# Patient Record
Sex: MALE | Race: WHITE | URBAN - METROPOLITAN AREA
[De-identification: names, ages, dates, MRNs, and addresses within clinical notes are randomized per-mention and may not be internally consistent; named-entity substitution may affect disease eponyms.]

---

## 2023-01-13 ENCOUNTER — OFFICE VISIT (OUTPATIENT)
Dept: URBAN - METROPOLITAN AREA CLINIC 98 | Facility: CLINIC | Age: 48
End: 2023-01-13
Payer: COMMERCIAL

## 2023-01-13 ENCOUNTER — LAB OUTSIDE AN ENCOUNTER (OUTPATIENT)
Dept: URBAN - METROPOLITAN AREA CLINIC 98 | Facility: CLINIC | Age: 48
End: 2023-01-13

## 2023-01-13 ENCOUNTER — WEB ENCOUNTER (OUTPATIENT)
Dept: URBAN - METROPOLITAN AREA CLINIC 98 | Facility: CLINIC | Age: 48
End: 2023-01-13

## 2023-01-13 VITALS
WEIGHT: 250.2 LBS | DIASTOLIC BLOOD PRESSURE: 86 MMHG | SYSTOLIC BLOOD PRESSURE: 137 MMHG | HEIGHT: 72 IN | TEMPERATURE: 97.1 F | BODY MASS INDEX: 33.89 KG/M2 | HEART RATE: 79 BPM

## 2023-01-13 DIAGNOSIS — R74.8 ELEVATED LIVER ENZYMES: ICD-10-CM

## 2023-01-13 PROCEDURE — 99244 OFF/OP CNSLTJ NEW/EST MOD 40: CPT | Performed by: INTERNAL MEDICINE

## 2023-01-13 PROCEDURE — 99204 OFFICE O/P NEW MOD 45 MIN: CPT | Performed by: INTERNAL MEDICINE
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
## 2023-02-22 ENCOUNTER — OFFICE VISIT (OUTPATIENT)
Dept: URBAN - METROPOLITAN AREA SURGERY CENTER 18 | Facility: SURGERY CENTER | Age: 48
End: 2023-02-22
Payer: COMMERCIAL

## 2023-02-22 DIAGNOSIS — Z12.11 COLON CANCER SCREENING: ICD-10-CM

## 2023-02-22 DIAGNOSIS — D12.4 ADENOMA OF DESCENDING COLON: ICD-10-CM

## 2023-02-22 PROCEDURE — G8907 PT DOC NO EVENTS ON DISCHARG: HCPCS | Performed by: INTERNAL MEDICINE

## 2023-02-22 PROCEDURE — 45385 COLONOSCOPY W/LESION REMOVAL: CPT | Performed by: INTERNAL MEDICINE

## 2023-07-13 ENCOUNTER — LAB OUTSIDE AN ENCOUNTER (OUTPATIENT)
Dept: URBAN - METROPOLITAN AREA CLINIC 98 | Facility: CLINIC | Age: 48
End: 2023-07-13

## 2023-07-20 PROBLEM — 197321007: Status: ACTIVE | Noted: 2023-07-20

## 2023-07-21 ENCOUNTER — OFFICE VISIT (OUTPATIENT)
Dept: URBAN - METROPOLITAN AREA CLINIC 98 | Facility: CLINIC | Age: 48
End: 2023-07-21
Payer: COMMERCIAL

## 2023-07-21 VITALS
TEMPERATURE: 97.4 F | HEIGHT: 72 IN | WEIGHT: 251.6 LBS | HEART RATE: 78 BPM | BODY MASS INDEX: 34.08 KG/M2 | DIASTOLIC BLOOD PRESSURE: 91 MMHG | SYSTOLIC BLOOD PRESSURE: 143 MMHG

## 2023-07-21 DIAGNOSIS — E78.2 ELEVATED CHOLESTEROL WITH HIGH TRIGLYCERIDES: ICD-10-CM

## 2023-07-21 DIAGNOSIS — K76.0 FATTY (CHANGE OF) LIVER, NOT ELSEWHERE CLASSIFIED: ICD-10-CM

## 2023-07-21 DIAGNOSIS — I25.10 ATHEROSCLEROTIC HEART DISEASE OF NATIVE CORONARY ARTERY WITHOUT ANGINA PECTORIS: ICD-10-CM

## 2023-07-21 DIAGNOSIS — R74.8 ELEVATED LIVER ENZYMES: ICD-10-CM

## 2023-07-21 DIAGNOSIS — I25.84 CORONARY ATHEROSCLEROSIS DUE TO CALCIFIED CORONARY LESION: ICD-10-CM

## 2023-07-21 PROBLEM — 92517006: Status: ACTIVE | Noted: 2023-07-21

## 2023-07-21 PROBLEM — 129591001: Status: ACTIVE | Noted: 2023-07-21

## 2023-07-21 PROBLEM — 445512009: Status: ACTIVE | Noted: 2023-07-21

## 2023-07-21 PROCEDURE — 99214 OFFICE O/P EST MOD 30 MIN: CPT | Performed by: INTERNAL MEDICINE

## 2023-07-21 NOTE — HPI-TODAY'S VISIT:
HEre to follow up Found M spike on labs by Dr Gresham  Sent to [facility] for monoclonal gammopathy  BM bx and PET done : nl per reports

## 2023-07-21 NOTE — HPI-OTHER HISTORIES
7/2023 BONE MARROW (ASPIRATE SMEAR, CLOT SECTION, TOUCH PREPARATION AND CORE BIOPSY):   - NORMOCELLULAR MARROW (60% CELLULAR) WITH TRILINEAGE HEMATOPOIESIS AND       INVOLVED BY A PLASMA CELL NEOPLASM.     - PLASMA CELLS COMPRISE 25-30% OF MARROW CELLULARITY AND ARE KAPPA       RESTRICTED.   - SEE MICROSCOPIC DESCRIPTION AND COMMENT.                                                                          Alida Nj MD                                                                      ***Electronically Signed Out***  Comment In summary, the findings are those of a normocellular marrow with maturing trilineage hematopoiesis.  Plasma cells comprise 25-30% of the marrow cellularity by immunohistochemistry and are kappa restricted by both immunohistochemistry and flow cytometry.  The patient's history of a monoclonal gammopathy is noted.  Correlation with pending genetic studies is recommended.  A final summary report will be issued after pending ancillary studies are performed by [employer].   REPORT [facility] 7/2023 F-18 FDG PET/CT WHOLE BODY CT PERFORMED FOR ATTENUATION CORRECTION AND ANATOMIC LOCALIZATION  CLINICAL HISTORY: 47-year-old male with monoclonal gammopathy. Patient presents for whole-body F-18 FDG PET/CT scan for staging/evaluation for multiple myeloma.  TECHNIQUE:  Multiple helical CT images were taken from the skull vertex to the feet for attenuation correction and anatomic localization cysts. Oral contrast was administered 1 hour prior to the CT examination. No intravenous contrast was administered. 57 minutes after the intravenous administration of a net dose of 14.30 mCi F-18 FDG, whole body PET imaging was performed. The images obtained were evaluated following reformation and reconstruction in the axial, sagittal and coronal planes. The patient's blood glucose level at the time of the examination was 186 mg/dL.  COMPARISON: CT guided bone marrow biopsy from July 14, 2023  INITIAL PET/CT SCAN  This exam was performed using automated exposure control. Up-to-date CT equipment and radiation dose reduction techniques were employed.   FINDINGS: HEAD AND NECK: There is no significant FDG avid lymphadenopathy in the neck. There are subcentimeter cervical lymph nodes with FDG uptake below blood pool level, likely nonspecific/reactive. The paranasal sinuses and mastoid air cells are clear.  The airway is widely patent.  There is no FDG avid nodules within the thyroid gland.  THORAX: There is no FDG avid lymphadenopathy in the chest. There is no FDG avid pulmonary foci. No pleural or pericardial effusion. Hiatal hernia noted. Coronary calcifications noted.  ABDOMEN AND PELVIS: The spleen is normal in size and FDG uptake. There is physiologic distribution of radiotracer within the liver, spleen, kidneys, adrenal glands, and pancreas. There is no FDG avid lymphadenopathy in the abdomen and pelvis. There is variable nonspecific FDG uptake within the bowel. Fatty infiltration of the liver noted. Fat-containing umbilical hernia noted.  SKELETAL SYSTEM: There is no FDG avid osseous lesions.  IMPRESSION:  1. No metabolically active lytic osseous lesions to suggest metabolically active multiple myeloma. 2. No significant FDG avid abnormalities in the neck, chest, abdomen or pelvis. .  5/2023 IGG 2326  tg 174 ldl 80 alt 76 ast 36 alp 64 tbili 0.6 cr 1.03 a1c 8.2  hb 14.6 plt 226

## 2023-11-01 ENCOUNTER — LAB OUTSIDE AN ENCOUNTER (OUTPATIENT)
Dept: URBAN - METROPOLITAN AREA CLINIC 98 | Facility: CLINIC | Age: 48
End: 2023-11-01

## 2024-01-12 ENCOUNTER — OFFICE VISIT (OUTPATIENT)
Dept: URBAN - METROPOLITAN AREA CLINIC 98 | Facility: CLINIC | Age: 49
End: 2024-01-12

## 2024-02-20 ENCOUNTER — LAB (OUTPATIENT)
Dept: URBAN - METROPOLITAN AREA CLINIC 98 | Facility: CLINIC | Age: 49
End: 2024-02-20

## 2024-02-20 ENCOUNTER — OV EP (OUTPATIENT)
Dept: URBAN - METROPOLITAN AREA CLINIC 98 | Facility: CLINIC | Age: 49
End: 2024-02-20
Payer: COMMERCIAL

## 2024-02-20 VITALS
HEIGHT: 72 IN | WEIGHT: 251 LBS | DIASTOLIC BLOOD PRESSURE: 90 MMHG | TEMPERATURE: 97.9 F | SYSTOLIC BLOOD PRESSURE: 143 MMHG | BODY MASS INDEX: 34 KG/M2 | HEART RATE: 74 BPM

## 2024-02-20 DIAGNOSIS — R74.8 ELEVATED LIVER ENZYMES: ICD-10-CM

## 2024-02-20 DIAGNOSIS — I25.84 CORONARY ATHEROSCLEROSIS DUE TO CALCIFIED CORONARY LESION: ICD-10-CM

## 2024-02-20 DIAGNOSIS — E78.2 ELEVATED CHOLESTEROL WITH HIGH TRIGLYCERIDES: ICD-10-CM

## 2024-02-20 DIAGNOSIS — I25.10 ATHEROSCLEROTIC HEART DISEASE OF NATIVE CORONARY ARTERY WITHOUT ANGINA PECTORIS: ICD-10-CM

## 2024-02-20 DIAGNOSIS — K76.0 FATTY (CHANGE OF) LIVER, NOT ELSEWHERE CLASSIFIED: ICD-10-CM

## 2024-02-20 PROCEDURE — 99214 OFFICE O/P EST MOD 30 MIN: CPT | Performed by: INTERNAL MEDICINE

## 2024-08-12 ENCOUNTER — LAB OUTSIDE AN ENCOUNTER (OUTPATIENT)
Dept: URBAN - METROPOLITAN AREA CLINIC 98 | Facility: CLINIC | Age: 49
End: 2024-08-12

## 2024-08-29 ENCOUNTER — OFFICE VISIT (OUTPATIENT)
Dept: URBAN - METROPOLITAN AREA CLINIC 98 | Facility: CLINIC | Age: 49
End: 2024-08-29

## 2024-10-24 ENCOUNTER — OFFICE VISIT (OUTPATIENT)
Dept: URBAN - METROPOLITAN AREA CLINIC 98 | Facility: CLINIC | Age: 49
End: 2024-10-24
Payer: COMMERCIAL

## 2024-10-24 ENCOUNTER — DASHBOARD ENCOUNTERS (OUTPATIENT)
Age: 49
End: 2024-10-24

## 2024-10-24 VITALS
DIASTOLIC BLOOD PRESSURE: 97 MMHG | WEIGHT: 241.8 LBS | HEART RATE: 87 BPM | SYSTOLIC BLOOD PRESSURE: 145 MMHG | HEIGHT: 72 IN | BODY MASS INDEX: 32.75 KG/M2 | TEMPERATURE: 97.3 F

## 2024-10-24 DIAGNOSIS — I25.10 ATHEROSCLEROTIC HEART DISEASE OF NATIVE CORONARY ARTERY WITHOUT ANGINA PECTORIS: ICD-10-CM

## 2024-10-24 DIAGNOSIS — K76.0 FATTY (CHANGE OF) LIVER, NOT ELSEWHERE CLASSIFIED: ICD-10-CM

## 2024-10-24 DIAGNOSIS — R74.8 ELEVATED LIVER ENZYMES: ICD-10-CM

## 2024-10-24 DIAGNOSIS — E78.2 ELEVATED CHOLESTEROL WITH HIGH TRIGLYCERIDES: ICD-10-CM

## 2024-10-24 DIAGNOSIS — I25.84 CORONARY ATHEROSCLEROSIS DUE TO CALCIFIED CORONARY LESION: ICD-10-CM

## 2024-10-24 PROCEDURE — 99213 OFFICE O/P EST LOW 20 MIN: CPT | Performed by: INTERNAL MEDICINE

## 2024-10-24 NOTE — HPI-TODAY'S VISIT:
Has lost weight through carb reduction  added implanted glucose monitor which has helped him learn about how to eat

## 2025-04-24 ENCOUNTER — LAB OUTSIDE AN ENCOUNTER (OUTPATIENT)
Dept: URBAN - METROPOLITAN AREA CLINIC 98 | Facility: CLINIC | Age: 50
End: 2025-04-24

## 2025-04-24 ENCOUNTER — OFFICE VISIT (OUTPATIENT)
Dept: URBAN - METROPOLITAN AREA CLINIC 98 | Facility: CLINIC | Age: 50
End: 2025-04-24
Payer: COMMERCIAL

## 2025-04-24 DIAGNOSIS — R74.8 ELEVATED LIVER ENZYMES: ICD-10-CM

## 2025-04-24 DIAGNOSIS — K57.90 DIVERTICULAR DISEASE: ICD-10-CM

## 2025-04-24 DIAGNOSIS — I25.84 CORONARY ATHEROSCLEROSIS DUE TO CALCIFIED CORONARY LESION: ICD-10-CM

## 2025-04-24 DIAGNOSIS — Z86.0101 PERSONAL HISTORY OF ADENOMATOUS AND SERRATED COLON POLYPS: ICD-10-CM

## 2025-04-24 DIAGNOSIS — K76.0 FATTY (CHANGE OF) LIVER, NOT ELSEWHERE CLASSIFIED: ICD-10-CM

## 2025-04-24 DIAGNOSIS — E78.2 ELEVATED CHOLESTEROL WITH HIGH TRIGLYCERIDES: ICD-10-CM

## 2025-04-24 DIAGNOSIS — I25.10 ATHEROSCLEROTIC HEART DISEASE OF NATIVE CORONARY ARTERY WITHOUT ANGINA PECTORIS: ICD-10-CM

## 2025-04-24 PROBLEM — 397881000: Status: ACTIVE | Noted: 2025-04-24

## 2025-04-24 PROCEDURE — 99214 OFFICE O/P EST MOD 30 MIN: CPT | Performed by: INTERNAL MEDICINE

## 2025-04-24 NOTE — HPI-TODAY'S VISIT:
Got implantable glucose monitor : helped him change diet and increase exercise  lost 20 lbs  Dr Kaiser endocrine  also working with nutritionist  working with apps to help with this as well in Dec and March : given abx for diverticulitis - dull ache in lower abdomen  . PET [facility] 3/2025  IMPRESSION: 1. Whole body F-18 FDG PET/CT scan is negative for sites of metabolically active macroscopic multiple myeloma. 2. New intense uptake localizing to new masslike thickening of the sigmoid colon associated with multiple diverticula which could represent inflammation/infection associated with diverticular disease, though a primary sigmoid colon malignancy could have this appearance. Recommend further clinical evaluation and consideration for flexible sigmoidoscopy.  BM 2-3 bm / day  CT 3/2025  IMPRESSION: 1.    Acute sigmoid diverticulitis. 2.    1 mm lower pole left renal stone. No hydronephrosis. No ureterolithiasis. 3.    Fatty liver. 4.    Coronary arterial calcifications.

## 2025-05-21 ENCOUNTER — CLAIMS CREATED FROM THE CLAIM WINDOW (OUTPATIENT)
Dept: URBAN - METROPOLITAN AREA SURGERY CENTER 18 | Facility: SURGERY CENTER | Age: 50
End: 2025-05-21
Payer: COMMERCIAL

## 2025-05-21 ENCOUNTER — CLAIMS CREATED FROM THE CLAIM WINDOW (OUTPATIENT)
Dept: URBAN - METROPOLITAN AREA CLINIC 4 | Facility: CLINIC | Age: 50
End: 2025-05-21
Payer: COMMERCIAL

## 2025-05-21 DIAGNOSIS — K63.3 ULCER OF INTESTINE: ICD-10-CM

## 2025-05-21 DIAGNOSIS — Z12.11 COLON CANCER SCREENING (HIGH RISK): ICD-10-CM

## 2025-05-21 DIAGNOSIS — Z86.0100 PERSONAL HISTORY OF COLON POLYPS, UNSPECIFIED: ICD-10-CM

## 2025-05-21 DIAGNOSIS — E66.01 OBESITY: ICD-10-CM

## 2025-05-21 DIAGNOSIS — I10 BP (HIGH BLOOD PRESSURE): ICD-10-CM

## 2025-05-21 DIAGNOSIS — Z86.0100 PERSONAL HISTORY OF COLONIC POLYPS: ICD-10-CM

## 2025-05-21 DIAGNOSIS — K63.3 ILEUM ULCER: ICD-10-CM

## 2025-05-21 PROCEDURE — 0529F INTRVL 3/>YR PTS CLNSCP DOCD: CPT | Performed by: INTERNAL MEDICINE

## 2025-05-21 PROCEDURE — 45380 COLONOSCOPY AND BIOPSY: CPT | Performed by: INTERNAL MEDICINE

## 2025-05-21 PROCEDURE — 00812 ANES LWR INTST SCR COLSC: CPT | Performed by: NURSE ANESTHETIST, CERTIFIED REGISTERED

## 2025-05-21 PROCEDURE — 88305 TISSUE EXAM BY PATHOLOGIST: CPT | Performed by: PATHOLOGY

## 2025-05-23 ENCOUNTER — OFFICE VISIT (OUTPATIENT)
Dept: URBAN - METROPOLITAN AREA CLINIC 97 | Facility: CLINIC | Age: 50
End: 2025-05-23

## 2025-06-06 ENCOUNTER — CLAIMS CREATED FROM THE CLAIM WINDOW (OUTPATIENT)
Dept: URBAN - METROPOLITAN AREA CLINIC 117 | Facility: CLINIC | Age: 50
End: 2025-06-06
Payer: COMMERCIAL

## 2025-06-06 ENCOUNTER — OFFICE VISIT (OUTPATIENT)
Dept: URBAN - METROPOLITAN AREA CLINIC 97 | Facility: CLINIC | Age: 50
End: 2025-06-06
Payer: COMMERCIAL

## 2025-06-06 DIAGNOSIS — K74.01 HEPATIC FIBROSIS, EARLY FIBROSIS: ICD-10-CM

## 2025-06-06 PROCEDURE — 76981 USE PARENCHYMA: CPT | Performed by: INTERNAL MEDICINE

## 2025-06-11 ENCOUNTER — LAB OUTSIDE AN ENCOUNTER (OUTPATIENT)
Dept: URBAN - METROPOLITAN AREA CLINIC 98 | Facility: CLINIC | Age: 50
End: 2025-06-11

## 2025-06-11 ENCOUNTER — TELEPHONE ENCOUNTER (OUTPATIENT)
Dept: URBAN - METROPOLITAN AREA CLINIC 98 | Facility: CLINIC | Age: 50
End: 2025-06-11

## 2025-06-13 ENCOUNTER — TELEPHONE ENCOUNTER (OUTPATIENT)
Dept: URBAN - METROPOLITAN AREA CLINIC 98 | Facility: CLINIC | Age: 50
End: 2025-06-13